# Patient Record
Sex: FEMALE | Race: WHITE | NOT HISPANIC OR LATINO | URBAN - METROPOLITAN AREA
[De-identification: names, ages, dates, MRNs, and addresses within clinical notes are randomized per-mention and may not be internally consistent; named-entity substitution may affect disease eponyms.]

---

## 2017-11-17 ENCOUNTER — OFFICE (OUTPATIENT)
Dept: URBAN - METROPOLITAN AREA CLINIC 11 | Facility: CLINIC | Age: 22
End: 2017-11-17

## 2017-11-17 VITALS
HEIGHT: 60 IN | WEIGHT: 117 LBS | DIASTOLIC BLOOD PRESSURE: 72 MMHG | HEART RATE: 52 BPM | SYSTOLIC BLOOD PRESSURE: 101 MMHG

## 2017-11-17 DIAGNOSIS — K21.9 GASTRO-ESOPHAGEAL REFLUX DISEASE WITHOUT ESOPHAGITIS: ICD-10-CM

## 2017-11-17 PROCEDURE — 99213 OFFICE O/P EST LOW 20 MIN: CPT | Performed by: INTERNAL MEDICINE

## 2017-11-17 NOTE — SERVICENOTES
We dsicussed her GERD and recurrent control on the PPIs.  We discussed the longterm use of the PPIs.  As to the issues with smitha, we discussed a possible motility issue, improvement with PPIs, and f/u studies.  She and her mother have wanted to hold on studies for the moment.

## 2017-11-17 NOTE — SERVICEHPINOTES
She presents for f/u of her reflux.  She has had some intermittent issues with sore throats that she thought was related to her reflux.  She has been on the Protonix daily for the past few weeks.  She did have issues last year wtih allergic issues after exposures to a mold at her [facility], [place], operated by [facility] in [place].  She had a trial off of the protonix for a few months and started having heartburn intemittently.  The Protonix was resarted about 6-8 weeks ago and the sx have stopped.  Her mother noted that she has stopped eating steak.  She has been eating other solids wtihout issues but thought that the steak passed slowly.  She has not had vomiting issues.  She did have an UGI last year with some motility issues noted and a normal EGD of the esophagus.